# Patient Record
Sex: FEMALE | Race: WHITE | Employment: FULL TIME | URBAN - METROPOLITAN AREA
[De-identification: names, ages, dates, MRNs, and addresses within clinical notes are randomized per-mention and may not be internally consistent; named-entity substitution may affect disease eponyms.]

---

## 2021-05-24 LAB — PAP SMEAR, EXTERNAL: NORMAL

## 2023-01-12 LAB — PAP SMEAR, EXTERNAL: NORMAL

## 2023-12-28 ENCOUNTER — OFFICE VISIT (OUTPATIENT)
Dept: FAMILY MEDICINE CLINIC | Age: 33
End: 2023-12-28
Payer: COMMERCIAL

## 2023-12-28 VITALS
HEIGHT: 68 IN | SYSTOLIC BLOOD PRESSURE: 110 MMHG | DIASTOLIC BLOOD PRESSURE: 70 MMHG | HEART RATE: 66 BPM | WEIGHT: 144 LBS | BODY MASS INDEX: 21.82 KG/M2 | OXYGEN SATURATION: 99 %

## 2023-12-28 DIAGNOSIS — Z00.00 ANNUAL PHYSICAL EXAM: ICD-10-CM

## 2023-12-28 DIAGNOSIS — Z13.29 THYROID DISORDER SCREEN: ICD-10-CM

## 2023-12-28 DIAGNOSIS — Z13.220 LIPID SCREENING: ICD-10-CM

## 2023-12-28 DIAGNOSIS — T73.2XXA FATIGUE DUE TO EXPOSURE, INITIAL ENCOUNTER: Primary | ICD-10-CM

## 2023-12-28 PROCEDURE — 99395 PREV VISIT EST AGE 18-39: CPT | Performed by: NURSE PRACTITIONER

## 2023-12-28 NOTE — PROGRESS NOTES
2023    Carlene Gaffney (:  1990) is a 34 y.o. female, here for a preventive medicine evaluation. Patient Active Problem List   Diagnosis    GERD (gastroesophageal reflux disease)    Delivery of pregnancy by  section     Presenting to establish as new patient. Significant surgical hx  Tonsillectomy due to recurrent strep. Has 2 children. One vaginal/one caesarian   Works full time. Exercise: goes to gym   Sleep-good  Diet-stable no restrictions. Mood: more anxiety since     Dental Exam-last year   Eye Exam-will schedule    Tobacco use-never  Alcohol use-occasional. X1 week. Sunscreen-yes    Last Pap: follows w/ [facility]   Hx abnormal PAP: yes  Contraception-birth control-ocps. FH of breast cancer: maternal-grandmother  FH of GYN cancer: ovarian-mom had in     Dr. Alisa Arzola in [address]. Having intermittent feelings of dizziness, feels it could be positional. Feels off, started 2 yrs ago after 2nd child birth. Denies si/hi. No exercise intolerance. Has cough intermittently as well. No sob, cp, palpitations, syncope. Review of Systems   Constitutional:  Positive for fatigue. Negative for activity change, appetite change, fever and unexpected weight change. Respiratory:  Negative for cough, chest tightness, shortness of breath and wheezing. Cardiovascular:  Negative for chest pain, palpitations and leg swelling. Gastrointestinal:  Negative for constipation, diarrhea, nausea and vomiting. Genitourinary: Negative. Negative for difficulty urinating and dysuria. Musculoskeletal: Negative. Negative for gait problem. Neurological:  Positive for dizziness. Negative for syncope, weakness, light-headedness, numbness and headaches. Psychiatric/Behavioral: Negative.          Prior to Visit Medications    Not on File        No Known Allergies    Past Medical History:   Diagnosis Date    Anxiety     Headache        Past Surgical History:   Procedure Laterality Date

## 2023-12-28 NOTE — PATIENT INSTRUCTIONS
I will send a few of the recommended places in the city. You can also go to PsychologyToday. com and search zip code or city for local options. [email or web address]. com/us/[email or web address]   May utilize the website and filter by location and insurance coverage     I refer most of my patients to [address] in [facility], and have received positive feedback from patients on their experiences there. See contact information below. [employer])   ? Most insurances accepted ? Many locations - you can search using zipcode/city. ? Recommend scheduling online due to long wait times for calls   ? Phone: [phone]   ? [email or web address] [employer]     [facility] Location [address]      [facility]   ? Does not accept insurance; Private Pay only   ? For more information, email [email or web address]   ? Two Locations   ? [address] [facility] [address] [facility] [facility]., [facility], [address]    ? [address] [facility] [facility], [address]  ? Phone: [phone]   ? Trumpet Search     [facility]   ? Accepts many private insurance plans   ? For more information, email [email or web address]   ? Three Locations ? [place] ? [address] [facility], [address]   [place] ? [address] [place], [address] [address] [place] ? [address] [facility].[address]   ? Phone: [phone]   ? [place]     [facility]  ? Accepts many insurance policies, Medicaid/Medicare   ? Offers self-pay discounts   ? Many locations across [place] and [place]   ? For more information, email [email or web address]   ? Phone: [phone]   ? [email or web address]. [email or web address]     [facility]   ? Likely will be out-of-network provider   ? Costs range from $100-$350   ? For more information, email [email or web address]. [employer]   ? [address] [place], [address] [place] [address] Avenue   ? Phone: [phone]

## 2024-01-22 DIAGNOSIS — Z13.220 LIPID SCREENING: ICD-10-CM

## 2024-01-22 DIAGNOSIS — T73.2XXA FATIGUE DUE TO EXPOSURE, INITIAL ENCOUNTER: ICD-10-CM

## 2024-01-22 DIAGNOSIS — Z00.00 ANNUAL PHYSICAL EXAM: ICD-10-CM

## 2024-01-22 DIAGNOSIS — Z13.29 THYROID DISORDER SCREEN: ICD-10-CM

## 2024-01-22 LAB
25(OH)D3 SERPL-MCNC: 31.5 NG/ML
ALBUMIN SERPL-MCNC: 5.1 G/DL (ref 3.4–5)
ALBUMIN/GLOB SERPL: 2.4 {RATIO} (ref 1.1–2.2)
ALP SERPL-CCNC: 41 U/L (ref 40–129)
ALT SERPL-CCNC: 20 U/L (ref 10–40)
ANION GAP SERPL CALCULATED.3IONS-SCNC: 10 MMOL/L (ref 3–16)
AST SERPL-CCNC: 21 U/L (ref 15–37)
BILIRUB SERPL-MCNC: 0.6 MG/DL (ref 0–1)
BUN SERPL-MCNC: 12 MG/DL (ref 7–20)
CALCIUM SERPL-MCNC: 9.3 MG/DL (ref 8.3–10.6)
CHLORIDE SERPL-SCNC: 98 MMOL/L (ref 99–110)
CHOLEST SERPL-MCNC: 171 MG/DL (ref 0–199)
CO2 SERPL-SCNC: 28 MMOL/L (ref 21–32)
CREAT SERPL-MCNC: 0.6 MG/DL (ref 0.6–1.1)
DEPRECATED RDW RBC AUTO: 12 % (ref 12.4–15.4)
GFR SERPLBLD CREATININE-BSD FMLA CKD-EPI: >60 ML/MIN/{1.73_M2}
GLUCOSE SERPL-MCNC: 77 MG/DL (ref 70–99)
HCT VFR BLD AUTO: 42 % (ref 36–48)
HDLC SERPL-MCNC: 76 MG/DL (ref 40–60)
HGB BLD-MCNC: 14.3 G/DL (ref 12–16)
LDLC SERPL CALC-MCNC: 83 MG/DL
MCH RBC QN AUTO: 31.1 PG (ref 26–34)
MCHC RBC AUTO-ENTMCNC: 34 G/DL (ref 31–36)
MCV RBC AUTO: 91.3 FL (ref 80–100)
PLATELET # BLD AUTO: 145 K/UL (ref 135–450)
PLATELET BLD QL SMEAR: ADEQUATE
PMV BLD AUTO: 10.9 FL (ref 5–10.5)
POTASSIUM SERPL-SCNC: 3.8 MMOL/L (ref 3.5–5.1)
PROT SERPL-MCNC: 7.2 G/DL (ref 6.4–8.2)
RBC # BLD AUTO: 4.6 M/UL (ref 4–5.2)
SLIDE REVIEW: ABNORMAL
SODIUM SERPL-SCNC: 136 MMOL/L (ref 136–145)
TRIGL SERPL-MCNC: 60 MG/DL (ref 0–150)
TSH SERPL DL<=0.005 MIU/L-ACNC: 2.01 UIU/ML (ref 0.27–4.2)
VIT B12 SERPL-MCNC: 717 PG/ML (ref 211–911)
VLDLC SERPL CALC-MCNC: 12 MG/DL
WBC # BLD AUTO: 5.1 K/UL (ref 4–11)

## 2024-01-29 DIAGNOSIS — R77.0 HIGH SERUM ALBUMIN: Primary | ICD-10-CM

## 2024-02-02 ENCOUNTER — OFFICE VISIT (OUTPATIENT)
Dept: FAMILY MEDICINE CLINIC | Age: 34
End: 2024-02-02
Payer: COMMERCIAL

## 2024-02-02 VITALS — DIASTOLIC BLOOD PRESSURE: 62 MMHG | HEART RATE: 69 BPM | SYSTOLIC BLOOD PRESSURE: 114 MMHG | OXYGEN SATURATION: 99 %

## 2024-02-02 DIAGNOSIS — R42 ORTHOSTATIC DIZZINESS: Primary | ICD-10-CM

## 2024-02-02 PROCEDURE — 99213 OFFICE O/P EST LOW 20 MIN: CPT | Performed by: NURSE PRACTITIONER

## 2024-02-02 ASSESSMENT — ENCOUNTER SYMPTOMS
COUGH: 0
NAUSEA: 0
CONSTIPATION: 0
DIARRHEA: 0
CHEST TIGHTNESS: 0
WHEEZING: 0
VOMITING: 0
SHORTNESS OF BREATH: 0

## 2024-02-02 NOTE — PROGRESS NOTES
2024  Idris Barber (: 1990)  33 y.o.    ASSESSMENT and PLAN:  Idris was seen today for fatigue.    Diagnoses and all orders for this visit:    Orthostatic dizziness  -     Damian Arias MD, Cardiac Electrophysiology, [facility]  -     Cardiac event monitor; Future  -recommend EP evaluation for autonomic dysfunction  -trial knee high compression socks, especially during exercise.   -could try monitoring v/s at home.  -discussed importance of hydration.   -try one week heart monitor to rule out any other ectopy/arrhythmia  -continue tracking symptoms.   -recent labs wnl.     Return in about 3 months (around 2024), or if symptoms worsen or fail to improve.    HPI  Presenting for f/u on fatigue and dizziness.     Dizziness appears to be worse on exertion and during exertion. Has been consistent since 2nd child born 2 yrs ago.   Denies any recent covid/flu infections.  Having intermittent coughing spells.   Has tried zyrtec  Nothing seems to improve symptoms.   Notices elevated HR during exercise, gets dizzy with positional changes.     On birth control, was following with gyn. Out of birth control. Hasn't had period regularly for  2yrs since last child was born.     Review of Systems   Constitutional:  Negative for activity change, appetite change, fatigue, fever and unexpected weight change.   Respiratory:  Negative for cough, chest tightness, shortness of breath and wheezing.    Cardiovascular:  Negative for chest pain, palpitations and leg swelling.   Gastrointestinal:  Negative for constipation, diarrhea, nausea and vomiting.   Genitourinary: Negative.  Negative for difficulty urinating and dysuria.   Musculoskeletal: Negative.  Negative for gait problem.   Neurological: Negative.  Negative for dizziness, syncope, weakness, light-headedness, numbness and headaches.   Psychiatric/Behavioral: Negative.         Allergies, past medical history, family history, and social history reviewed and

## 2024-02-09 ENCOUNTER — NURSE ONLY (OUTPATIENT)
Dept: CARDIOLOGY CLINIC | Age: 34
End: 2024-02-09

## 2024-02-09 DIAGNOSIS — R42 ORTHOSTATIC DIZZINESS: Primary | ICD-10-CM

## 2024-02-09 NOTE — PROGRESS NOTES
Monitor company [employer]  Length of monitor 7 days  Monitor ordered by Yves NEELY  Patch ID [id number]  Device number MercyA- [id number]  Activation successful prior to pt leaving hospital? Yes

## 2024-02-12 ENCOUNTER — TELEPHONE (OUTPATIENT)
Dept: CARDIOLOGY CLINIC | Age: 34
End: 2024-02-12

## 2024-02-12 NOTE — TELEPHONE ENCOUNTER
Pt stated that she got an event monitor placed on 2/9 and the Tegaderm is gone, pt was advised she can  a new one in office. Pt then stated that the patch is popping up in the middle and she wants to know if it is ready correctly. Pt was also given VC phone number.    Monitor company [employer]  Length of monitor 7 days  Monitor ordered by Yves NEELY  Patch ID [id number]  Device number MercyA- [id number]  Activation successful prior to pt leaving hospital? Yes

## 2024-02-13 NOTE — TELEPHONE ENCOUNTER
Spoke with patient. I checked on the VC site and she is monitoring. There do not look to be any issues or interruptions.

## 2024-02-22 PROCEDURE — 93228 REMOTE 30 DAY ECG REV/REPORT: CPT | Performed by: INTERNAL MEDICINE

## 2024-02-29 DIAGNOSIS — R42 DIZZINESS: Primary | ICD-10-CM

## 2024-02-29 DIAGNOSIS — R42 ORTHOSTATIC DIZZINESS: ICD-10-CM

## 2024-03-01 DIAGNOSIS — R53.83 FATIGUE, UNSPECIFIED TYPE: ICD-10-CM

## 2024-03-01 DIAGNOSIS — R00.1 BRADYCARDIA: ICD-10-CM

## 2024-03-01 DIAGNOSIS — R42 ORTHOSTATIC DIZZINESS: Primary | ICD-10-CM

## 2024-03-15 ENCOUNTER — OFFICE VISIT (OUTPATIENT)
Dept: CARDIOLOGY CLINIC | Age: 34
End: 2024-03-15
Payer: COMMERCIAL

## 2024-03-15 VITALS
WEIGHT: 147 LBS | SYSTOLIC BLOOD PRESSURE: 90 MMHG | OXYGEN SATURATION: 100 % | HEART RATE: 66 BPM | HEIGHT: 68 IN | DIASTOLIC BLOOD PRESSURE: 58 MMHG | BODY MASS INDEX: 22.28 KG/M2

## 2024-03-15 DIAGNOSIS — Z87.898 H/O SYNCOPE: ICD-10-CM

## 2024-03-15 DIAGNOSIS — R00.1 BRADYCARDIA: ICD-10-CM

## 2024-03-15 DIAGNOSIS — R42 DIZZINESS: ICD-10-CM

## 2024-03-15 DIAGNOSIS — R00.0 SINUS TACHYCARDIA: ICD-10-CM

## 2024-03-15 DIAGNOSIS — Z76.89 ESTABLISHING CARE WITH NEW DOCTOR, ENCOUNTER FOR: ICD-10-CM

## 2024-03-15 DIAGNOSIS — R00.2 PALPITATIONS: Primary | ICD-10-CM

## 2024-03-15 PROCEDURE — 99204 OFFICE O/P NEW MOD 45 MIN: CPT | Performed by: INTERNAL MEDICINE

## 2024-03-15 PROCEDURE — 93000 ELECTROCARDIOGRAM COMPLETE: CPT | Performed by: INTERNAL MEDICINE

## 2024-03-15 NOTE — PATIENT INSTRUCTIONS
PLAN:  Increase water intake (electrolytes included) at least 80 ounces in a day  Increase salt intake (salt your foods, electrolytes, salty snacks)  Compression socks (15 - 20 mmHg)   No change or addition in medications   For a longevity workout, recommend moderate intensity instead of high intensity   Orthostasis information printed

## 2024-03-15 NOTE — PROGRESS NOTES
CARDIOLOGY CONSULTATION        Patient Name: Idris Barber  Primary Care physician: Lorri Marinelli APRN - CNP    Reason for Referral/Chief Complaint: Idris Barber is a 34 y.o. patient who is referred to cardiology clinic today by ALANNA Marinelli CNP,  for evaluation and treatment of dizziness.     History of Present Illness:   Idris Barber 34 y.o. female with a medical history notable for GERD, dizziness, and palpitations.     Patient referred for dizziness.  Cardiac monitor worn 2/9/24-2/16/24 showed predominately NSR, nocturnal bradycardia, symptomatic PVC's and PSVT.      Today, she reports feeling fatigue, dizzy and off balance feeling with upright posture. Wore heart monitor ( as above) and referred here. She tries to work-out 3-6 times a week.   She does strength and cardio training at [facility]. High intensity workouts.  If she is doing a lot of position changes she gets lightheaded - brett trouble with burpies and other up and down maneuvers.   If she runs a lot her heart feels like it will pound out of her chest.  Has noted HR near 200 at times by apple watch. She Does get short of breath when her heart is racing. She has prior episode of syncope. Last episode was 2 months ago while working out.  She had gotten up abruptly from ground and promptly lost consciousness.  Recalls another occurrence that happened after standing up to go downstairs - she passed out before half way down the stairs, <10s on her feet.  States fluid intake is \"decent\".   Around 40+ ounces per day.   1 cup of coffee and a rare 1/2 a soda.  The patient denies chest pain with exercise.  Denies paroxysmal nocturnal dyspnea, orthopnea, increasing lower extremity edema or weight gain.  Sleeps on 1 pillow and denies snoring. She is schedule for sleep evaluation presumably based no read of nocturnal bradycardia.     Non smoker. No illicit drug use. No stimulants prescribed. Denies thyroid disease, acute blood loss, anemia,

## 2024-04-30 ENCOUNTER — TELEPHONE (OUTPATIENT)
Dept: PULMONOLOGY | Age: 34
End: 2024-04-30

## 2024-04-30 NOTE — TELEPHONE ENCOUNTER
Patient cancelled appointment on 5/1 with Chani Gore for NPT R/B [place], Fatigue, orthostatic dizziness, bradycardia ( no Prior SS) .    Reason: did not specify    Patient did not reschedule appointment.    Does not want to r/s at this time

## 2025-07-25 ENCOUNTER — OFFICE VISIT (OUTPATIENT)
Dept: FAMILY MEDICINE CLINIC | Age: 35
End: 2025-07-25

## 2025-07-25 VITALS
BODY MASS INDEX: 21.9 KG/M2 | DIASTOLIC BLOOD PRESSURE: 70 MMHG | WEIGHT: 144 LBS | SYSTOLIC BLOOD PRESSURE: 120 MMHG | OXYGEN SATURATION: 98 % | HEART RATE: 64 BPM

## 2025-07-25 DIAGNOSIS — Z30.011 ENCOUNTER FOR INITIAL PRESCRIPTION OF CONTRACEPTIVE PILLS: ICD-10-CM

## 2025-07-25 DIAGNOSIS — Z12.4 PAP SMEAR FOR CERVICAL CANCER SCREENING: Primary | ICD-10-CM

## 2025-07-25 LAB
CONTROL: NEGATIVE
PREGNANCY TEST URINE, POC: NEGATIVE

## 2025-07-25 RX ORDER — NORETHINDRONE ACETATE AND ETHINYL ESTRADIOL 1MG-20(21)
1 KIT ORAL DAILY
Qty: 1 PACKET | Refills: 5 | Status: SHIPPED | OUTPATIENT
Start: 2025-07-25

## 2025-07-25 RX ORDER — NORGESTIMATE AND ETHINYL ESTRADIOL 7DAYSX3 28
1 KIT ORAL DAILY
Qty: 28 TABLET | Refills: 3 | Status: SHIPPED | OUTPATIENT
Start: 2025-07-25 | End: 2025-07-25

## 2025-07-25 RX ORDER — NORETHINDRONE ACETATE AND ETHINYL ESTRADIOL 1MG-20(21)
1 KIT ORAL DAILY
Qty: 1 PACKET | Refills: 3 | Status: SHIPPED | OUTPATIENT
Start: 2025-07-25 | End: 2025-07-25

## 2025-07-25 SDOH — ECONOMIC STABILITY: FOOD INSECURITY: WITHIN THE PAST 12 MONTHS, THE FOOD YOU BOUGHT JUST DIDN'T LAST AND YOU DIDN'T HAVE MONEY TO GET MORE.: NEVER TRUE

## 2025-07-25 SDOH — ECONOMIC STABILITY: FOOD INSECURITY: WITHIN THE PAST 12 MONTHS, YOU WORRIED THAT YOUR FOOD WOULD RUN OUT BEFORE YOU GOT MONEY TO BUY MORE.: NEVER TRUE

## 2025-07-25 ASSESSMENT — PATIENT HEALTH QUESTIONNAIRE - PHQ9
1. LITTLE INTEREST OR PLEASURE IN DOING THINGS: NOT AT ALL
2. FEELING DOWN, DEPRESSED OR HOPELESS: NOT AT ALL
SUM OF ALL RESPONSES TO PHQ QUESTIONS 1-9: 0

## 2025-07-25 NOTE — PROGRESS NOTES
Collected Thin Prep Pap Smear.  Transport Ambient.    Collected Theresa PCR Female Swab   Transport Refrigerated.  Labeled and placed in bag with orders.   (ALL URINE CX TO BE PLACED IN THE FRIDGE)       POCT Pregnancy collected & results given to provider.     Called pharmacy & most recent contraceptive was in 2023-BLISOVI FE 21  Patient informed of change in prescription.

## 2025-07-25 NOTE — PROGRESS NOTES
2025    Idris Barber (:  1990) is a 35 y.o. female, here for a preventive medicine evaluation.    Subjective   Patient Active Problem List   Diagnosis    GERD (gastroesophageal reflux disease)    Delivery of pregnancy by  section    Sinus tachycardia    Dizziness    H/O syncope    Bradycardia     Presenting for women exam.     Hx abnormal PAP:   Sexual activity: yes  Contraception: has hx of ocp use.   Hx of abnormal mammogram:   Self-breast exams: yes  FH of breast cancer: maternal grandmother with breast  FH of GYN cancer: mother has ovarian.    Having regular periods about every 28 days, 4-5 days, light.   Previously got sick on ocps, previously saw gyn in   Currently sexual activity. No hx of blood clots. Doesn't smoke.     HM  Last Cervical Cancer Screen-today.     Review of Systems   Constitutional:  Negative for activity change, appetite change, fatigue, fever and unexpected weight change.   Respiratory:  Negative for cough, chest tightness, shortness of breath and wheezing.    Cardiovascular:  Negative for chest pain, palpitations and leg swelling.   Gastrointestinal:  Negative for constipation, diarrhea, nausea and vomiting.   Genitourinary: Negative.  Negative for difficulty urinating and dysuria.   Musculoskeletal: Negative.  Negative for gait problem.   Neurological: Negative.  Negative for dizziness, syncope, weakness, light-headedness, numbness and headaches.   Psychiatric/Behavioral: Negative.         Prior to Visit Medications    Not on File        No Known Allergies    Past Medical History:   Diagnosis Date    Anxiety     Headache        Past Surgical History:   Procedure Laterality Date     SECTION N/A 2021    WISDOM TOOTH EXTRACTION      took 3 teeth.        Social History     Socioeconomic History    Marital status: Single     Spouse name: Not on file    Number of children: Not on file    Years of education: Not on file    Highest education level: Not on

## 2025-07-26 LAB
ALBUMIN SERPL-MCNC: 4.7 G/DL (ref 3.4–5)
ALBUMIN/GLOB SERPL: 2 {RATIO} (ref 1.1–2.2)
ALP SERPL-CCNC: 36 U/L (ref 40–129)
ALT SERPL-CCNC: 18 U/L (ref 10–40)
ANION GAP SERPL CALCULATED.3IONS-SCNC: 11 MMOL/L (ref 3–16)
AST SERPL-CCNC: 16 U/L (ref 15–37)
BILIRUB SERPL-MCNC: 0.5 MG/DL (ref 0–1)
BUN SERPL-MCNC: 16 MG/DL (ref 7–20)
CALCIUM SERPL-MCNC: 9.6 MG/DL (ref 8.3–10.6)
CHLORIDE SERPL-SCNC: 102 MMOL/L (ref 99–110)
CO2 SERPL-SCNC: 27 MMOL/L (ref 21–32)
CREAT SERPL-MCNC: 0.7 MG/DL (ref 0.6–1.1)
GFR SERPLBLD CREATININE-BSD FMLA CKD-EPI: >90 ML/MIN/{1.73_M2}
GLUCOSE SERPL-MCNC: 99 MG/DL (ref 70–99)
POTASSIUM SERPL-SCNC: 4.2 MMOL/L (ref 3.5–5.1)
PROT SERPL-MCNC: 7 G/DL (ref 6.4–8.2)
SODIUM SERPL-SCNC: 140 MMOL/L (ref 136–145)

## 2025-07-28 LAB
C TRACH DNA CVX QL NAA+PROBE: NEGATIVE
N GONORRHOEA DNA CERV MUCUS QL NAA+PROBE: NEGATIVE

## 2025-07-30 LAB
HPV HR 12 DNA SPEC QL NAA+PROBE: NOT DETECTED
HPV16 DNA SPEC QL NAA+PROBE: NOT DETECTED
HPV16+18+H RISK 12 DNA SPEC-IMP: NORMAL
HPV18 DNA SPEC QL NAA+PROBE: NOT DETECTED

## 2025-08-04 DIAGNOSIS — Z76.89 ENCOUNTER TO ESTABLISH CARE: Primary | ICD-10-CM

## 2025-08-19 ENCOUNTER — OFFICE VISIT (OUTPATIENT)
Dept: FAMILY MEDICINE CLINIC | Age: 35
End: 2025-08-19
Payer: COMMERCIAL

## 2025-08-19 VITALS
BODY MASS INDEX: 22.34 KG/M2 | HEIGHT: 68 IN | WEIGHT: 147.4 LBS | OXYGEN SATURATION: 95 % | SYSTOLIC BLOOD PRESSURE: 100 MMHG | TEMPERATURE: 97.2 F | HEART RATE: 55 BPM | DIASTOLIC BLOOD PRESSURE: 70 MMHG

## 2025-08-19 DIAGNOSIS — T50.905A ADVERSE EFFECT OF DRUG, INITIAL ENCOUNTER: ICD-10-CM

## 2025-08-19 DIAGNOSIS — Z30.011 ENCOUNTER FOR INITIAL PRESCRIPTION OF CONTRACEPTIVE PILLS: Primary | ICD-10-CM

## 2025-08-19 PROCEDURE — 99213 OFFICE O/P EST LOW 20 MIN: CPT

## 2025-08-19 RX ORDER — HYDROXYZINE HYDROCHLORIDE 25 MG/1
25 TABLET, FILM COATED ORAL EVERY 8 HOURS PRN
Qty: 21 TABLET | Refills: 0 | Status: SHIPPED | OUTPATIENT
Start: 2025-08-19 | End: 2025-08-26

## 2025-08-19 SDOH — ECONOMIC STABILITY: FOOD INSECURITY: WITHIN THE PAST 12 MONTHS, YOU WORRIED THAT YOUR FOOD WOULD RUN OUT BEFORE YOU GOT MONEY TO BUY MORE.: NEVER TRUE

## 2025-08-19 ASSESSMENT — ENCOUNTER SYMPTOMS
TROUBLE SWALLOWING: 0
CHEST TIGHTNESS: 0
DIARRHEA: 0
CONSTIPATION: 0
SHORTNESS OF BREATH: 0
ABDOMINAL PAIN: 0
COUGH: 0
SINUS PRESSURE: 0
NAUSEA: 0

## 2025-08-20 RX ORDER — ACETAMINOPHEN AND CODEINE PHOSPHATE 120; 12 MG/5ML; MG/5ML
1 SOLUTION ORAL DAILY
Qty: 28 TABLET | Refills: 3 | Status: SHIPPED | OUTPATIENT
Start: 2025-08-20 | End: 2025-12-10